# Patient Record
Sex: MALE | Race: OTHER | HISPANIC OR LATINO | Employment: OTHER | URBAN - METROPOLITAN AREA
[De-identification: names, ages, dates, MRNs, and addresses within clinical notes are randomized per-mention and may not be internally consistent; named-entity substitution may affect disease eponyms.]

---

## 2023-05-29 ENCOUNTER — OFFICE VISIT (OUTPATIENT)
Dept: URGENT CARE | Facility: CLINIC | Age: 38
End: 2023-05-29
Payer: MEDICAID

## 2023-05-29 VITALS
RESPIRATION RATE: 18 BRPM | HEIGHT: 73 IN | OXYGEN SATURATION: 99 % | HEART RATE: 65 BPM | SYSTOLIC BLOOD PRESSURE: 139 MMHG | DIASTOLIC BLOOD PRESSURE: 66 MMHG | WEIGHT: 225 LBS | TEMPERATURE: 98 F | BODY MASS INDEX: 29.82 KG/M2

## 2023-05-29 DIAGNOSIS — T14.90XA INJURY: ICD-10-CM

## 2023-05-29 DIAGNOSIS — S80.811A ABRASION OF RIGHT LOWER EXTREMITY, INITIAL ENCOUNTER: ICD-10-CM

## 2023-05-29 DIAGNOSIS — S80.11XA CONTUSION OF RIGHT LOWER EXTREMITY, INITIAL ENCOUNTER: Primary | ICD-10-CM

## 2023-05-29 PROCEDURE — 99203 OFFICE O/P NEW LOW 30 MIN: CPT | Mod: S$GLB,,, | Performed by: PHYSICIAN ASSISTANT

## 2023-05-29 PROCEDURE — 73590 XR TIBIA FIBULA 2 VIEW RIGHT: ICD-10-PCS | Mod: RT,S$GLB,, | Performed by: RADIOLOGY

## 2023-05-29 PROCEDURE — 99203 PR OFFICE/OUTPT VISIT, NEW, LEVL III, 30-44 MIN: ICD-10-PCS | Mod: S$GLB,,, | Performed by: PHYSICIAN ASSISTANT

## 2023-05-29 PROCEDURE — 73590 X-RAY EXAM OF LOWER LEG: CPT | Mod: RT,S$GLB,, | Performed by: RADIOLOGY

## 2023-05-29 RX ORDER — OXYCODONE AND ACETAMINOPHEN 5; 325 MG/1; MG/1
1 TABLET ORAL EVERY 6 HOURS PRN
Qty: 20 TABLET | Refills: 0 | Status: SHIPPED | OUTPATIENT
Start: 2023-05-29 | End: 2023-06-05

## 2023-05-29 RX ORDER — IBUPROFEN 800 MG/1
800 TABLET ORAL EVERY 6 HOURS PRN
Qty: 30 TABLET | Refills: 0 | Status: SHIPPED | OUTPATIENT
Start: 2023-05-29

## 2023-05-29 RX ORDER — MUPIROCIN 20 MG/G
OINTMENT TOPICAL 2 TIMES DAILY
Qty: 30 G | Refills: 1 | Status: SHIPPED | OUTPATIENT
Start: 2023-05-29

## 2023-05-29 NOTE — PATIENT INSTRUCTIONS
Please review attached instructions.    You must understand that you've received an Urgent Care treatment only and that you may be released before all your medical problems are known or treated. You, the patient, will arrange for follow up care as instructed.  Follow up with your PCP or specialty clinic as directed in the next 1-2 weeks if not improved or as needed.  You may call [phone] to schedule an appointment with the appropriate provider.  If your condition worsens we recommend that you receive another evaluation at the emergency room immediately or contact your primary medical clinics after hours call service to discuss your concerns.    If you were prescribed a narcotic or controlled medication, do not drive or operate heavy equipment or machinery while taking these medications.    If you smoke, please stop smoking.

## 2023-05-29 NOTE — LETTER
May 29, 2023      [facility] - [place] [address], [address]  [facility] [address]  Phone: [phone]  Fax: [phone]       Patient: Gómez Mary   YOB: 1985  Date of Visit: 05/29/2023    To Whom It May Concern:    Maureen Mary  was at [facility] on 05/29/2023. The patient may return to work/school on 5/30/2023 with restrictions. Avoid climbing, prolonged walking. Sit or stand as needed. Recommend restrictions for 1-2 weeks. If you have any questions or concerns, or if I can be of further assistance, please do not hesitate to contact me.    Sincerely,          Roderick Raines PA-C

## 2023-05-29 NOTE — PROGRESS NOTES
"Subjective:      Patient ID: Gómez Mary is a 38 y.o. male.    Vitals:  height is 6' 1" (1.854 m) and weight is 102.1 kg (225 lb). His temperature is 97.8 °F (36.6 °C). His blood pressure is 139/66 and his pulse is 65. His respiration is 18 and oxygen saturation is 99%.     Chief Complaint: Leg Injury    38 year old male presents today with a right leg injury that occurred at home. Abrasion. Describes the pain as a constant throbbing pain. Pain increases while leg is dangling. Pain scale 10/10. Denies any prior injury to leg before. States upon wakening the pain is unbearable and unable to put any pressure on it. States he fell about 2 feet and landed on concrete. Treatments at home include Motrin with no relief. Injury occurred on 05/27/2023.     Leg Pain   Incident onset: 05/27/2023. The incident occurred at home. The injury mechanism was a fall. The pain is present in the right leg. Quality: throbbing. The pain is at a severity of 10/10. Pertinent negatives include no inability to bear weight, loss of motion, loss of sensation, muscle weakness, numbness or tingling. The symptoms are aggravated by weight bearing, movement and palpation. He has tried NSAIDs for the symptoms. The treatment provided no relief.     Constitution: Positive for activity change.   HENT:  Negative for facial trauma.    Neck: Negative for neck pain.   Cardiovascular:  Negative for chest trauma.   Eyes:  Negative for eye trauma.   Respiratory:  Negative for shortness of breath.    Gastrointestinal:  Negative for abdominal trauma.   Musculoskeletal:  Positive for pain, trauma and pain with walking.   Skin:  Positive for abrasion. Negative for erythema.   Neurological:  Negative for numbness and tingling.    Objective:     Physical Exam   Constitutional: He appears well-developed. He is cooperative. No distress.   HENT:   Head: Normocephalic and atraumatic.   Ears:   Right Ear: External ear normal. No decreased hearing is noted.   Left Ear: " External ear normal. No decreased hearing is noted.   Nose: Nose normal.   Mouth/Throat: Oropharynx is clear and moist.   Eyes: Conjunctivae are normal. Right conjunctiva is not injected. Left conjunctiva is not injected. No scleral icterus.   Neck: Trachea normal. No pain with movement present.   Cardiovascular: Normal pulses.   Pulses:       Dorsalis pedis pulses are 2+ on the right side and 2+ on the left side.        Posterior tibial pulses are 2+ on the right side and 2+ on the left side.   Pulmonary/Chest: Effort normal. No respiratory distress.   Musculoskeletal:      Right knee: Normal.      Right ankle: Normal. He exhibits normal pulse.      Right lower leg: He exhibits tenderness and swelling. He exhibits no deformity.        Legs:       Comments: Right leg swelling, tenderness about the anterior aspect.  2 long abrasions anterior aspect, no SSI.  Right lower extremity neurovascularly intact.   Neurological: no focal deficit. He is alert. He has normal motor skills and normal sensation. He displays no weakness (BLE). No sensory deficit (light touch). Gait normal.   Skin: Skin is warm, dry and not diaphoretic. No erythema   Psychiatric: He experiences Normal attention. His speech is normal and behavior is normal. Mood and thought content normal.   Nursing note and vitals reviewed.    Assessment:     1. Contusion of right lower extremity, initial encounter    2. Injury    3. Abrasion of right lower extremity, initial encounter        Plan:           Contusion of right lower extremity, initial encounter  -     ibuprofen (ADVIL,MOTRIN) 800 MG tablet; Take 1 tablet (800 mg total) by mouth every 6 (six) hours as needed for Pain. Take with meals.  Dispense: 30 tablet; Refill: 0  -     oxyCODONE-acetaminophen (PERCOCET) 5-325 mg per tablet; Take 1 tablet by mouth every 6 (six) hours as needed for Pain (Take off duty only.).  Dispense: 20 tablet; Refill: 0  -     CRUTCHES FOR HOME USE    Injury  -     XR TIBIA  FIBULA 2 VIEW RIGHT; Future; Expected date: 05/29/2023  -     XR TIBIA FIBULA 2 VIEW RIGHT; Future; Expected date: 05/29/2023    Abrasion of right lower extremity, initial encounter  -     mupirocin (BACTROBAN) 2 % ointment; Apply topically 2 (two) times daily. AAA  Dispense: 30 g; Refill: 1      Patient Instructions   Please review attached instructions.    You must understand that you've received an Urgent Care treatment only and that you may be released before all your medical problems are known or treated. You, the patient, will arrange for follow up care as instructed.  Follow up with your PCP or specialty clinic as directed in the next 1-2 weeks if not improved or as needed.  You may call [phone] to schedule an appointment with the appropriate provider.  If your condition worsens we recommend that you receive another evaluation at the emergency room immediately or contact your primary medical clinics after hours call service to discuss your concerns.    If you were prescribed a narcotic or controlled medication, do not drive or operate heavy equipment or machinery while taking these medications.    If you smoke, please stop smoking.